# Patient Record
Sex: FEMALE | NOT HISPANIC OR LATINO | URBAN - METROPOLITAN AREA
[De-identification: names, ages, dates, MRNs, and addresses within clinical notes are randomized per-mention and may not be internally consistent; named-entity substitution may affect disease eponyms.]

---

## 2019-12-27 ENCOUNTER — IMPORTED ENCOUNTER (OUTPATIENT)
Dept: URBAN - METROPOLITAN AREA CLINIC 1 | Facility: CLINIC | Age: 59
End: 2019-12-27

## 2019-12-27 PROBLEM — H40.013: Noted: 2019-12-27

## 2019-12-27 PROBLEM — H25.812: Noted: 2019-12-27

## 2019-12-27 PROBLEM — H40.023: Noted: 2019-12-27

## 2019-12-27 PROBLEM — Z96.1: Noted: 2019-12-27

## 2019-12-27 PROBLEM — H26.491: Noted: 2019-12-27

## 2019-12-27 PROBLEM — H26.492: Noted: 2019-12-27

## 2019-12-27 PROCEDURE — 92250 FUNDUS PHOTOGRAPHY W/I&R: CPT

## 2019-12-27 PROCEDURE — 92004 COMPRE OPH EXAM NEW PT 1/>: CPT

## 2019-12-27 NOTE — PATIENT DISCUSSION
PCO OD -- Visually Significant *secondary to glare*; schedule YAG Cap. Pros cons risks and benefits.

## 2019-12-27 NOTE — PATIENT DISCUSSION
Glaucoma Suspect OU -- (CD 0.700.75) IOP  FamilyHx. Patient is considered high risk. Condition was discussed with patient and patient understands. Will continue to monitor patient for any progression in condition. Patient was advised to call us with any problems questions or concerns.

## 2020-02-04 ENCOUNTER — IMPORTED ENCOUNTER (OUTPATIENT)
Dept: URBAN - METROPOLITAN AREA CLINIC 1 | Facility: CLINIC | Age: 60
End: 2020-02-04

## 2020-02-04 PROBLEM — H26.491: Noted: 2020-02-04

## 2020-02-04 PROCEDURE — 92136 OPHTHALMIC BIOMETRY: CPT

## 2020-02-04 PROCEDURE — 66821 AFTER CATARACT LASER SURGERY: CPT

## 2020-02-04 NOTE — PATIENT DISCUSSION
1.  Cataract OS:  Visually Significant secondary to glare discussed the risks benefits alternatives and limitations of cataract surgery. The patient stated a full understanding and a desire to proceed with the procedure. Discussed with patient if PO Gtts are more than $120 for all three combined when filling at their Pharmacy please call our office to request generic substitutions. Pt came to pre-op appointment today with her . Pt understands they will need glasses post-op to achieve their best corrected vision. Lifestyle Questionnaire Completed. Phaco PCL OS only; Toric IOL OS. 2.  PCO OD- Visually Significant secondary to glare; schedule YAG Cap OD only. Pros cons risks and benefits. 3.  Same day YAG CAP OD: (Consent signed and scanned into attachments) 1 gtt Prolensa applied. The purpose and nature of the procedure possible alternative methods of treatment the risks involved and the possibility of complications were discussed with patient. The Patient wishes to proceed and the consent was signed. The laser was then performed under topical anesthesia with no complications. Post op instructions were given to patient as well as a follow-up appointment. Patient was advised to call our office if any questions or concerns. 4.  COAG Suspect OU (CD 0.7/0.75) Fm Hx. W/u after Phaco. 5.  Pseudophakia OD (Done elsewhere) F/u as scheduled for Phaco OS only.

## 2020-02-04 NOTE — PATIENT DISCUSSION
YAG CAP OD: (Consent signed and scanned into attachments) 1 gtt Prolensa applied. The purpose and nature of the procedure possible alternative methods of treatment the risks involved and the possibility of complications were discussed with patient. The Patient wishes to proceed and the consent was signed. The laser was then performed under topical anesthesia with no complications. Post op instructions were given to patient as well as a follow-up appointment. Patient was advised to call our office if any questions or concerns. F/u as scheduled for Phaco OS only.

## 2020-02-17 PROBLEM — H25.812: Noted: 2020-02-17

## 2020-02-17 PROBLEM — H26.491: Noted: 2020-02-17

## 2020-02-19 ENCOUNTER — IMPORTED ENCOUNTER (OUTPATIENT)
Dept: URBAN - METROPOLITAN AREA CLINIC 1 | Facility: CLINIC | Age: 60
End: 2020-02-19

## 2020-02-20 ENCOUNTER — IMPORTED ENCOUNTER (OUTPATIENT)
Dept: URBAN - METROPOLITAN AREA CLINIC 1 | Facility: CLINIC | Age: 60
End: 2020-02-20

## 2020-02-20 PROBLEM — Z96.1: Noted: 2020-02-20

## 2020-02-20 PROCEDURE — 99024 POSTOP FOLLOW-UP VISIT: CPT

## 2020-02-20 NOTE — PATIENT DISCUSSION
POD#1 CE/IOL Toric OS doing well. Use Lotemax BID OS Prolensa Qdaily OS Ocuflox TID OS : Use all three gtts through completion of PO gtt chart regimen/ Per our instructions given to patient.   Post op Warnings Reiterated RTC as scheduled

## 2020-03-13 ENCOUNTER — IMPORTED ENCOUNTER (OUTPATIENT)
Dept: URBAN - METROPOLITAN AREA CLINIC 1 | Facility: CLINIC | Age: 60
End: 2020-03-13

## 2020-03-13 PROBLEM — Z09: Noted: 2020-03-13

## 2020-03-13 PROCEDURE — 99024 POSTOP FOLLOW-UP VISIT: CPT

## 2020-03-13 NOTE — PATIENT DISCUSSION
POW#3 Phaco/ PCL OUFinish PO meds per PO gtt schedule MRX for glasses givenReturn for an appointment in 4 month 30/OCT with Dr. Anne Underwood.

## 2020-07-10 ENCOUNTER — IMPORTED ENCOUNTER (OUTPATIENT)
Dept: URBAN - METROPOLITAN AREA CLINIC 1 | Facility: CLINIC | Age: 60
End: 2020-07-10

## 2020-07-10 PROBLEM — H40.013: Noted: 2020-07-10

## 2020-07-10 PROBLEM — Z96.1: Noted: 2020-07-10

## 2020-07-10 PROCEDURE — 92133 CPTRZD OPH DX IMG PST SGM ON: CPT

## 2020-07-10 PROCEDURE — 92014 COMPRE OPH EXAM EST PT 1/>: CPT

## 2020-07-10 NOTE — PATIENT DISCUSSION
1.  Glaucoma Suspect OU (CD 0.70/0.75): OCT WNL OU. IOP stable. Family hx (Sister). Patient is considered Low Risk. Condition was discussed with patient and patient understands. Will continue to monitor patient for any progression in condition. Patient was advised to call us with any problems questions or concerns. 2.  Pseudophakia OU - (OD Miah Netter OS Toric PMG)  H/o YAG Cap OD (PMG)  Patient deferred Manifest Rx today. Return for an appointment in 1 year 30/OCT with Dr. Mo Abdullahi.

## 2021-07-09 ENCOUNTER — IMPORTED ENCOUNTER (OUTPATIENT)
Dept: URBAN - METROPOLITAN AREA CLINIC 1 | Facility: CLINIC | Age: 61
End: 2021-07-09

## 2021-07-09 PROBLEM — Z96.1: Noted: 2021-07-09

## 2021-07-09 PROBLEM — H40.013: Noted: 2021-07-09

## 2021-07-09 PROCEDURE — 92133 CPTRZD OPH DX IMG PST SGM ON: CPT

## 2021-07-09 PROCEDURE — 92014 COMPRE OPH EXAM EST PT 1/>: CPT

## 2021-07-09 NOTE — PATIENT DISCUSSION
1.  Glaucoma Suspect OU (CD 0.70/0.75): OCT remains WNL OU. IOP stable. Family hx (Sister). Patient is considered Low Risk. Condition was discussed with patient and patient understands. Will continue to monitor patient for any progression in condition. Patient was advised to call us with any problems questions or concerns. 2.  Pseudophakia OU - (OD Dewey Wilson OS Toric PMG)  H/o YAG Cap OD (PMG)Return for an appointment in 1 year 27 with Dr. Erin Stiles.

## 2021-07-22 NOTE — PATIENT DISCUSSION
7 22 21 MILD SRF ON OCT - NO SIG LATE LEAKAGE ON FA - RECOM OBSERVATION AND REEVAL IN 2 MONTHS UNLESS IT GETS WORSE ON AMSLER IN WHICH CASE SHE WILL COME IN ASAP.

## 2021-09-22 NOTE — PATIENT DISCUSSION
9 22 21 NO INCREASE ON IMAGING - NOT LATE LEAKAGE ON FA TO SUGGEST ACTIVITY - TO HOLD ON TX AND REEVAL IN 6 MONTHS UNLESS PT NOTES CHANGE IN [place].

## 2022-04-02 ASSESSMENT — TONOMETRY
OS_IOP_MMHG: 17
OD_IOP_MMHG: 15
OS_IOP_MMHG: 20
OD_IOP_MMHG: 16
OS_IOP_MMHG: 17
OD_IOP_MMHG: 16
OD_IOP_MMHG: 15
OS_IOP_MMHG: 16
OS_IOP_MMHG: 16

## 2022-04-02 ASSESSMENT — VISUAL ACUITY
OS_CC: 20/200
OS_GLARE: 20/50
OD_SC: 20/20-1
OD_CC: 20/40
OS_CC: 20/50
OD_CC: 20/50
OS_SC: 20/30
OD_SC: 20/20
OD_GLARE: 20/40
OS_CC: 20/70
OD_CC: J1+
OD_CC: 20/50-2
OD_GLARE: 20/40
OS_CC: J1+
OS_CC: 20/200
OS_SC: 20/30
OD_CC: 20/50-2
OS_PH: SC 20/20
OS_GLARE: 20/50
OS_CC: J1+
OS_SC: 20/20
OD_SC: 20/20
OD_SC: 20/20
OD_CC: J1+
OS_SC: 20/20

## 2022-07-08 ENCOUNTER — COMPREHENSIVE EXAM (OUTPATIENT)
Dept: URBAN - METROPOLITAN AREA CLINIC 1 | Facility: CLINIC | Age: 62
End: 2022-07-08

## 2022-07-08 DIAGNOSIS — Z96.1: ICD-10-CM

## 2022-07-08 DIAGNOSIS — H40.013: ICD-10-CM

## 2022-07-08 DIAGNOSIS — H04.123: ICD-10-CM

## 2022-07-08 PROCEDURE — 92015 DETERMINE REFRACTIVE STATE: CPT

## 2022-07-08 PROCEDURE — 92014 COMPRE OPH EXAM EST PT 1/>: CPT

## 2022-07-08 ASSESSMENT — VISUAL ACUITY
OD_CC: 20/20
OS_CC: 20/20

## 2022-07-08 ASSESSMENT — TONOMETRY
OD_IOP_MMHG: 18
OS_IOP_MMHG: 17

## 2022-07-08 NOTE — PATIENT DISCUSSION
(CD 0.70/0.75): OCT remains WNL OU. IOP stable. Family hx (Sister). Patient is considered Low Risk. Condition was discussed with patient and patient understands. Will continue to monitor patient for any progression in condition. Patient was advised to call us with any problems questions or concerns.

## 2023-07-24 ENCOUNTER — COMPREHENSIVE EXAM (OUTPATIENT)
Dept: URBAN - METROPOLITAN AREA CLINIC 1 | Facility: CLINIC | Age: 63
End: 2023-07-24

## 2023-07-24 DIAGNOSIS — H40.023: ICD-10-CM

## 2023-07-24 DIAGNOSIS — H04.123: ICD-10-CM

## 2023-07-24 PROCEDURE — 92014 COMPRE OPH EXAM EST PT 1/>: CPT

## 2023-07-24 PROCEDURE — 92015 DETERMINE REFRACTIVE STATE: CPT

## 2023-07-24 PROCEDURE — 92133 CPTRZD OPH DX IMG PST SGM ON: CPT

## 2023-07-24 ASSESSMENT — VISUAL ACUITY
OD_CC: J1+
OS_CC: 20/20
OS_CC: J1+
OD_CC: 20/25-1

## 2023-07-24 ASSESSMENT — TONOMETRY
OD_IOP_MMHG: 16
OS_IOP_MMHG: 17

## 2023-11-15 NOTE — PATIENT DISCUSSION
Glaucoma Suspect OU :  Patient is considered Low Risk. Condition was discussed with patient and patient understands. Will continue to monitor patient for any progression in condition. Patient was advised to call us with any problems questions or concerns. Statement Selected

## 2024-07-22 ENCOUNTER — COMPREHENSIVE EXAM (OUTPATIENT)
Dept: URBAN - METROPOLITAN AREA CLINIC 1 | Facility: CLINIC | Age: 64
End: 2024-07-22

## 2024-07-22 DIAGNOSIS — Z96.1: ICD-10-CM

## 2024-07-22 DIAGNOSIS — H40.023: ICD-10-CM

## 2024-07-22 DIAGNOSIS — H04.123: ICD-10-CM

## 2024-07-22 PROCEDURE — 92133 CPTRZD OPH DX IMG PST SGM ON: CPT

## 2024-07-22 PROCEDURE — 92015 DETERMINE REFRACTIVE STATE: CPT

## 2024-07-22 PROCEDURE — 92014 COMPRE OPH EXAM EST PT 1/>: CPT

## 2024-07-22 ASSESSMENT — VISUAL ACUITY
OD_CC: 20/20
OS_CC: 20/20

## 2024-07-22 ASSESSMENT — TONOMETRY
OS_IOP_MMHG: 17
OD_IOP_MMHG: 17

## 2025-08-22 ENCOUNTER — COMPREHENSIVE EXAM (OUTPATIENT)
Age: 65
End: 2025-08-22

## 2025-08-22 DIAGNOSIS — Z96.1: ICD-10-CM

## 2025-08-22 DIAGNOSIS — H40.023: ICD-10-CM

## 2025-08-22 DIAGNOSIS — H04.123: ICD-10-CM

## 2025-08-22 PROCEDURE — 92133 CPTRZD OPH DX IMG PST SGM ON: CPT

## 2025-08-22 PROCEDURE — 92014 COMPRE OPH EXAM EST PT 1/>: CPT
